# Patient Record
Sex: FEMALE
[De-identification: names, ages, dates, MRNs, and addresses within clinical notes are randomized per-mention and may not be internally consistent; named-entity substitution may affect disease eponyms.]

---

## 2021-03-22 PROBLEM — Z00.00 ENCOUNTER FOR PREVENTIVE HEALTH EXAMINATION: Status: ACTIVE | Noted: 2021-03-22

## 2021-03-25 ENCOUNTER — APPOINTMENT (OUTPATIENT)
Dept: CARDIOLOGY | Facility: CLINIC | Age: 41
End: 2021-03-25
Payer: COMMERCIAL

## 2021-03-25 ENCOUNTER — NON-APPOINTMENT (OUTPATIENT)
Age: 41
End: 2021-03-25

## 2021-03-25 VITALS
BODY MASS INDEX: 30.91 KG/M2 | SYSTOLIC BLOOD PRESSURE: 112 MMHG | TEMPERATURE: 98.9 F | DIASTOLIC BLOOD PRESSURE: 82 MMHG | WEIGHT: 168 LBS | HEIGHT: 62 IN | OXYGEN SATURATION: 99 % | HEART RATE: 79 BPM

## 2021-03-25 VITALS — SYSTOLIC BLOOD PRESSURE: 108 MMHG | DIASTOLIC BLOOD PRESSURE: 82 MMHG

## 2021-03-25 DIAGNOSIS — Z82.3 FAMILY HISTORY OF STROKE: ICD-10-CM

## 2021-03-25 DIAGNOSIS — Z78.9 OTHER SPECIFIED HEALTH STATUS: ICD-10-CM

## 2021-03-25 DIAGNOSIS — Z3A.19 19 WEEKS GESTATION OF PREGNANCY: ICD-10-CM

## 2021-03-25 PROCEDURE — 93000 ELECTROCARDIOGRAM COMPLETE: CPT

## 2021-03-25 PROCEDURE — 99072 ADDL SUPL MATRL&STAF TM PHE: CPT

## 2021-03-25 PROCEDURE — 99202 OFFICE O/P NEW SF 15 MIN: CPT | Mod: 25

## 2021-03-25 RX ORDER — LABETALOL HYDROCHLORIDE 200 MG/1
200 TABLET, FILM COATED ORAL
Qty: 60 | Refills: 2 | Status: COMPLETED | COMMUNITY
Start: 2021-03-25 | End: 2021-03-25

## 2021-03-25 NOTE — PHYSICAL EXAM
[General Appearance - Well Developed] : well developed [Normal Appearance] : normal appearance [Well Groomed] : well groomed [General Appearance - Well Nourished] : well nourished [No Deformities] : no deformities [General Appearance - In No Acute Distress] : no acute distress [Normal Conjunctiva] : the conjunctiva exhibited no abnormalities [Eyelids - No Xanthelasma] : the eyelids demonstrated no xanthelasmas [Normal Oral Mucosa] : normal oral mucosa [No Oral Pallor] : no oral pallor [No Oral Cyanosis] : no oral cyanosis [Normal Jugular Venous A Waves Present] : normal jugular venous A waves present [Normal Jugular Venous V Waves Present] : normal jugular venous V waves present [No Jugular Venous Stein A Waves] : no jugular venous stein A waves [Heart Rate And Rhythm] : heart rate and rhythm were normal [Heart Sounds] : normal S1 and S2 [Murmurs] : no murmurs present [Respiration, Rhythm And Depth] : normal respiratory rhythm and effort [Exaggerated Use Of Accessory Muscles For Inspiration] : no accessory muscle use [Auscultation Breath Sounds / Voice Sounds] : lungs were clear to auscultation bilaterally [Abdomen Soft] : soft [Abdomen Tenderness] : non-tender [Abdomen Mass (___ Cm)] : no abdominal mass palpated [Abnormal Walk] : normal gait [Gait - Sufficient For Exercise Testing] : the gait was sufficient for exercise testing [Nail Clubbing] : no clubbing of the fingernails [Cyanosis, Localized] : no localized cyanosis [Petechial Hemorrhages (___cm)] : no petechial hemorrhages [Skin Color & Pigmentation] : normal skin color and pigmentation [] : no rash [No Venous Stasis] : no venous stasis [Skin Lesions] : no skin lesions [No Skin Ulcers] : no skin ulcer [No Xanthoma] : no  xanthoma was observed [Oriented To Time, Place, And Person] : oriented to person, place, and time [Affect] : the affect was normal [Mood] : the mood was normal [No Anxiety] : not feeling anxious

## 2021-03-25 NOTE — HISTORY OF PRESENT ILLNESS
[FreeTextEntry1] : 41 y/o F , currently at 18 weeks gestation (LMP 20), presents for initial visit of gestational HTN. She was started on labetalol 200mg q12 in January. Denies history of HTN or HTN during her previous pregnancies. She checks BP at home and it is usually well below 120/80. Currently feels well with no complaints. Denies HA, blurry vision, chest pain, SOB, palpitations, dizziness, lightheadedness. No LE edema. States she did 24 hour urine collection and results showed borderline proteinuria (asked to bring labs to next appointment). \par \par EKG: NSR, RSR'

## 2021-03-25 NOTE — ASSESSMENT
[FreeTextEntry1] : #Gestational HTN\par BP is well controlled. Patient requesting to d/c medication. \par Will decrease labetalol to 100mg q12 for now. Patient advised to continue checking BP at home, and to increase labetalol to 200mg if it is persistently >120/80. \par Patient advised to bring labs to her next visit\par RTC 1 month

## 2021-05-27 ENCOUNTER — APPOINTMENT (OUTPATIENT)
Dept: CARDIOLOGY | Facility: CLINIC | Age: 41
End: 2021-05-27
Payer: COMMERCIAL

## 2021-05-27 VITALS
SYSTOLIC BLOOD PRESSURE: 120 MMHG | BODY MASS INDEX: 32.2 KG/M2 | DIASTOLIC BLOOD PRESSURE: 60 MMHG | WEIGHT: 175 LBS | HEIGHT: 62 IN | HEART RATE: 84 BPM | TEMPERATURE: 99.6 F | OXYGEN SATURATION: 99 %

## 2021-05-27 PROCEDURE — 99072 ADDL SUPL MATRL&STAF TM PHE: CPT

## 2021-05-27 PROCEDURE — 99213 OFFICE O/P EST LOW 20 MIN: CPT

## 2021-05-27 RX ORDER — LABETALOL HYDROCHLORIDE 100 MG/1
100 TABLET, FILM COATED ORAL
Qty: 60 | Refills: 5 | Status: COMPLETED | COMMUNITY
Start: 2021-03-25 | End: 2021-05-27

## 2021-05-27 NOTE — PHYSICAL EXAM

## 2021-05-27 NOTE — HISTORY OF PRESENT ILLNESS
[FreeTextEntry1] : 39 y/o F , currently at 26 weeks gestation (LMP 20), presents for initial visit of gestational HTN. \par \par Denies history of HTN or HTN during her previous pregnancies. She checks BP at home and it is usually well below 120/80. She was started on labetalol 200mg q12 in January, which was decreased to 100mg q12 at previous visit. Currently, she states that she has been taking labetalol 100mg PRN, when BP is 130/80 (about 2-3 times per week). She states BP is well controlled on home log, which she does not have with her today. Feels well with no complaints. Denies HA, blurry vision, chest pain, SOB, palpitations, dizziness, lightheadedness. No LE edema. States she did 24 hour urine collection and results showed borderline proteinuria (asked to bring labs to next appointment). \par \par

## 2021-07-01 ENCOUNTER — APPOINTMENT (OUTPATIENT)
Dept: CARDIOLOGY | Facility: CLINIC | Age: 41
End: 2021-07-01
Payer: COMMERCIAL

## 2021-07-01 ENCOUNTER — NON-APPOINTMENT (OUTPATIENT)
Age: 41
End: 2021-07-01

## 2021-07-01 VITALS
BODY MASS INDEX: 32.94 KG/M2 | OXYGEN SATURATION: 99 % | SYSTOLIC BLOOD PRESSURE: 116 MMHG | HEIGHT: 62 IN | TEMPERATURE: 97.8 F | WEIGHT: 179 LBS | DIASTOLIC BLOOD PRESSURE: 78 MMHG | HEART RATE: 90 BPM

## 2021-07-01 DIAGNOSIS — Z3A.00 WEEKS OF GESTATION OF PREGNANCY NOT SPECIFIED: ICD-10-CM

## 2021-07-01 PROCEDURE — 93000 ELECTROCARDIOGRAM COMPLETE: CPT

## 2021-07-01 PROCEDURE — 93306 TTE W/DOPPLER COMPLETE: CPT

## 2021-07-01 PROCEDURE — 99213 OFFICE O/P EST LOW 20 MIN: CPT | Mod: 25

## 2021-07-01 PROCEDURE — 99072 ADDL SUPL MATRL&STAF TM PHE: CPT

## 2021-07-01 NOTE — HISTORY OF PRESENT ILLNESS
[FreeTextEntry1] : 41 y/o F , currently at 31 weeks gestation (LMP 20), presents for follow up of gestational HTN. \par Denies history of HTN or HTN during her previous pregnancies. Patient was previously seen 3/27/21. She started taking labetalol 100mg at bedtime, and notes her BP is well controlled during the day. When she checks it in the evening, SBP is often 130s-140s. She otherwise feels well with no complaints. Denies HA, blurry vision, chest pain, SOB, palpitations, dizziness, lightheadedness, syncope. Has trace LE edema. \par

## 2021-07-01 NOTE — ASSESSMENT
[FreeTextEntry1] : #Gestational HTN\par #31 weeks gestation \par BP currently mildly elevated in the evening; well controlled during the day\par Add AM dose of labetalol 100mg\par Continue BP log\par TTE was done; results pending \par RTC 1 month

## 2021-07-01 NOTE — PHYSICAL EXAM

## 2021-09-23 ENCOUNTER — NON-APPOINTMENT (OUTPATIENT)
Age: 41
End: 2021-09-23

## 2021-09-23 ENCOUNTER — APPOINTMENT (OUTPATIENT)
Dept: CARDIOLOGY | Facility: CLINIC | Age: 41
End: 2021-09-23
Payer: COMMERCIAL

## 2021-09-23 VITALS
SYSTOLIC BLOOD PRESSURE: 118 MMHG | HEIGHT: 62 IN | TEMPERATURE: 99.3 F | WEIGHT: 174 LBS | BODY MASS INDEX: 32.02 KG/M2 | OXYGEN SATURATION: 97 % | RESPIRATION RATE: 16 BRPM | HEART RATE: 95 BPM | DIASTOLIC BLOOD PRESSURE: 70 MMHG

## 2021-09-23 PROCEDURE — 93000 ELECTROCARDIOGRAM COMPLETE: CPT

## 2021-09-23 PROCEDURE — 99213 OFFICE O/P EST LOW 20 MIN: CPT

## 2021-09-23 RX ORDER — KRILL/OM-3/DHA/EPA/PHOSPHO/AST 1000-230MG
81 CAPSULE ORAL
Qty: 90 | Refills: 1 | Status: DISCONTINUED | COMMUNITY
Start: 2021-03-25 | End: 2021-09-23

## 2021-09-23 NOTE — HISTORY OF PRESENT ILLNESS
[FreeTextEntry1] : 39 y/o F  with gestational HTN, s/p  on 21 at 37 weeks with no complication. currently at 31 weeks gestation (LMP 20), presents for follow up. Denies history of HTN or HTN during her previous pregnancies. Patient was previously seen 21. Was started on labetalol 300mg q8 immediately postpartum, but since then has decreased back to labetalol 100mg twice daily. She otherwise feels well with no complaints. Denies HA, blurry vision, chest pain, SOB, palpitations, dizziness, lightheadedness, syncope. Has trace LE edema. \par

## 2021-09-23 NOTE — ASSESSMENT
[FreeTextEntry1] : #Gestational HTN\par S/p   at 37 weeks gestation \par Currently patient is on labetalol 100mg q12, states she did not take medication this morning \par /70\par Will decrease labetalol to 100mg at bedtime\par Patient advised to check BP in the morning and take an additional dose if it is >130/90\par RTC 1 month for BP check and further titration

## 2021-09-23 NOTE — CARDIOLOGY SUMMARY
[de-identified] : TTE 7/1/21: LVEF 55-60%, normal TTE [de-identified] : 9/23/21: NSR, normal EKG \par 3/25/21: NSR, RSR'

## 2021-10-28 ENCOUNTER — APPOINTMENT (OUTPATIENT)
Dept: CARDIOLOGY | Facility: CLINIC | Age: 41
End: 2021-10-28
Payer: COMMERCIAL

## 2021-10-28 VITALS
RESPIRATION RATE: 16 BRPM | OXYGEN SATURATION: 97 % | HEART RATE: 77 BPM | BODY MASS INDEX: 31.65 KG/M2 | WEIGHT: 172 LBS | HEIGHT: 62 IN | SYSTOLIC BLOOD PRESSURE: 112 MMHG | TEMPERATURE: 99.1 F | DIASTOLIC BLOOD PRESSURE: 80 MMHG

## 2021-10-28 DIAGNOSIS — O13.9 GESTATIONAL [PREGNANCY-INDUCED] HYPERTENSION W/OUT SIGNIFICANT PROTEINURIA, UNSPECIFIED TRIMESTER: ICD-10-CM

## 2021-10-28 PROCEDURE — 99213 OFFICE O/P EST LOW 20 MIN: CPT

## 2021-10-28 NOTE — HISTORY OF PRESENT ILLNESS
[FreeTextEntry1] : 40 y/o F  with gestational HTN, s/p  on 21 at 37 weeks with no complication, presents for follow up. Denies history of HTN or HTN during her previous pregnancies. Was started on labetalol 300mg q8 immediately postpartum, but since then has decreased back to labetalol 100mg twice daily. She otherwise feels well with no complaints. Denies HA, blurry vision, chest pain, SOB, palpitations, dizziness, lightheadedness, syncope. Has trace LE edema. \par \par 10/28/21:\par Was previously seen by me 21\par Labetalol was decreased to 100mg at bedtime\par Patient has BP log with her; BP is mostly controlled with -130, occasionally > 140\par Feels well with no current complaints \par

## 2021-10-28 NOTE — ASSESSMENT
[FreeTextEntry1] : 40 y/o F  with gestational HTN, s/p  on 21 at 37 weeks with no complication, presents for follow up. \par \par #Gestational HTN\par BP bordeline. In office today, 112/80. Home BP log mostly controlled. \par Dc labetalol\par DASH diet discussed in detail \par Continue home BP monitoring for 1 month off meds; if uncontrolled, will restart. \par \par #Obesity\par BMI 31\par Diet/lifestyle modifications discussed \par \par RTC 1 month

## 2021-10-28 NOTE — CARDIOLOGY SUMMARY
[de-identified] : 9/23/21: NSR, normal EKG \par 3/25/21: NSR, RSR'  [de-identified] : TTE 7/1/21: LVEF 55-60%, normal TTE

## 2021-11-23 ENCOUNTER — APPOINTMENT (OUTPATIENT)
Dept: OBGYN | Facility: CLINIC | Age: 41
End: 2021-11-23
Payer: COMMERCIAL

## 2021-11-23 VITALS
SYSTOLIC BLOOD PRESSURE: 138 MMHG | HEIGHT: 62 IN | BODY MASS INDEX: 32.02 KG/M2 | DIASTOLIC BLOOD PRESSURE: 74 MMHG | WEIGHT: 174 LBS

## 2021-11-23 DIAGNOSIS — Z30.09 ENCOUNTER FOR OTHER GENERAL COUNSELING AND ADVICE ON CONTRACEPTION: ICD-10-CM

## 2021-11-23 DIAGNOSIS — Z72.3 LACK OF PHYSICAL EXERCISE: ICD-10-CM

## 2021-11-23 DIAGNOSIS — O13.9 GESTATIONAL [PREGNANCY-INDUCED] HYPERTENSION W/OUT SIGNIFICANT PROTEINURIA, UNSPECIFIED TRIMESTER: ICD-10-CM

## 2021-11-23 DIAGNOSIS — Z82.49 FAMILY HISTORY OF ISCHEMIC HEART DISEASE AND OTHER DISEASES OF THE CIRCULATORY SYSTEM: ICD-10-CM

## 2021-11-23 PROCEDURE — 99203 OFFICE O/P NEW LOW 30 MIN: CPT

## 2021-11-23 RX ORDER — LABETALOL HYDROCHLORIDE 100 MG/1
100 TABLET, FILM COATED ORAL
Qty: 30 | Refills: 3 | Status: DISCONTINUED | COMMUNITY
Start: 2021-05-27 | End: 2021-11-23

## 2021-12-06 ENCOUNTER — NON-APPOINTMENT (OUTPATIENT)
Age: 41
End: 2021-12-06

## 2021-12-29 ENCOUNTER — APPOINTMENT (OUTPATIENT)
Dept: CARDIOLOGY | Facility: CLINIC | Age: 41
End: 2021-12-29

## 2022-01-13 ENCOUNTER — APPOINTMENT (OUTPATIENT)
Dept: CARDIOLOGY | Facility: CLINIC | Age: 42
End: 2022-01-13
Payer: COMMERCIAL

## 2022-01-13 VITALS
WEIGHT: 171 LBS | TEMPERATURE: 98.7 F | BODY MASS INDEX: 31.47 KG/M2 | RESPIRATION RATE: 16 BRPM | SYSTOLIC BLOOD PRESSURE: 128 MMHG | HEIGHT: 62 IN | OXYGEN SATURATION: 96 % | DIASTOLIC BLOOD PRESSURE: 80 MMHG | HEART RATE: 86 BPM

## 2022-01-13 PROCEDURE — 99213 OFFICE O/P EST LOW 20 MIN: CPT

## 2022-01-13 NOTE — HISTORY OF PRESENT ILLNESS
[FreeTextEntry1] : 40 y/o F  with gestational HTN, s/p  on 21 at 37 weeks with no complication, presents for follow up. Denies history of HTN or HTN during her previous pregnancies. Was started on labetalol 300mg q8 immediately postpartum, but since then has decreased back to labetalol 100mg twice daily. \par \par 22: \par Was previously seen by me 10/28/21\par Labetalol was restarted due to uncontrolled BP \par She otherwise feels well with no complaints. \par Denies HA, blurry vision, chest pain, SOB, palpitations, dizziness, lightheadedness, syncope, LE edema.

## 2022-01-13 NOTE — CARDIOLOGY SUMMARY
[de-identified] : 9/23/21: NSR, normal EKG \par 3/25/21: NSR, RSR'  [de-identified] : TTE 7/1/21: LVEF 55-60%, normal TTE

## 2022-01-13 NOTE — ASSESSMENT
[FreeTextEntry1] : 42 y/o F  with gestational HTN, s/p  on 21 at 37 weeks with no complication, presents for follow up. \par \par #HTN\par Controlled\par Continue labetalol 100mg at bedtime\par DASH diet discussed\par \par #Obesity\par BMI 31\par Diet/lifestyle modifications discussed\par \par Follow up with PCP\par RTC 1 year

## 2022-01-17 ENCOUNTER — APPOINTMENT (OUTPATIENT)
Dept: OBGYN | Facility: CLINIC | Age: 42
End: 2022-01-17

## 2022-01-31 ENCOUNTER — APPOINTMENT (OUTPATIENT)
Dept: OBGYN | Facility: CLINIC | Age: 42
End: 2022-01-31
Payer: COMMERCIAL

## 2022-01-31 VITALS
TEMPERATURE: 97.4 F | HEIGHT: 62 IN | WEIGHT: 173.6 LBS | DIASTOLIC BLOOD PRESSURE: 84 MMHG | SYSTOLIC BLOOD PRESSURE: 124 MMHG | BODY MASS INDEX: 31.94 KG/M2

## 2022-01-31 DIAGNOSIS — Z01.419 ENCOUNTER FOR GYNECOLOGICAL EXAMINATION (GENERAL) (ROUTINE) W/OUT ABNORMAL FINDINGS: ICD-10-CM

## 2022-01-31 DIAGNOSIS — Z12.31 ENCOUNTER FOR SCREENING MAMMOGRAM FOR MALIGNANT NEOPLASM OF BREAST: ICD-10-CM

## 2022-01-31 DIAGNOSIS — Z11.3 ENCOUNTER FOR SCREENING FOR INFECTIONS WITH A PREDOMINANTLY SEXUAL MODE OF TRANSMISSION: ICD-10-CM

## 2022-01-31 PROCEDURE — 99396 PREV VISIT EST AGE 40-64: CPT

## 2022-01-31 RX ORDER — FENUGREEK SEED/BL.THISTLE/ANIS 340 MG
0.267 & 373 CAPSULE ORAL DAILY
Refills: 0 | Status: DISCONTINUED | COMMUNITY
Start: 2021-03-25 | End: 2022-01-31

## 2022-01-31 NOTE — PHYSICAL EXAM
[Chaperone Present] : A chaperone was present in the examining room during all aspects of the physical examination [Appropriately responsive] : appropriately responsive [Alert] : alert [No Acute Distress] : no acute distress [Soft] : soft [Non-tender] : non-tender [Non-distended] : non-distended [No Mass] : no mass [Oriented x3] : oriented x3 [Examination Of The Breasts] : a normal appearance [No Discharge] : no discharge [No Masses] : no breast masses were palpable [Labia Majora] : normal [Labia Minora] : normal [Pink Rugae] : pink rugae [No Bleeding] : There was no active vaginal bleeding [Normal] : normal [Uterine Adnexae] : normal [FreeTextEntry1] : TREY Ma [External Hemorrhoid] : external hemorrhoid

## 2022-01-31 NOTE — HISTORY OF PRESENT ILLNESS
[FreeTextEntry1] : Ms. MONAHAN presents for her annual well woman visit. \par \par She is doing well. She denies having any abdominal pain, vaginal bleeding, or vaginal discharge. \par \par LMP: 2020. She is s/p  at [facility] in 2021. \par \par She is not sexually active. Her partner is considering a vasectomy. She declines contraception. She desires only STI vaginal swab testing. \par \par She desires a breast examination. She is currently breastfeeding and bottle-feeding. No breast complaints.\par \par She has h/o HTN and is taking Labetalol 100 mg qHS. She is currently following with PCP and Cardiology.\par \par She previously received her GYN care with Dr. Koch. [PGxTotal] : 6 [[facility]] : 3 [[facility]] : 3 [PGHxABInduced] : 1 [PGHxABSpont] : 2

## 2022-01-31 NOTE — DISCUSSION/SUMMARY
[FreeTextEntry1] : -Annual well woman visit done today. Pap collected. Patient desires only STI vaginal swab testing- GC/Chl/Trich were ordered.\par \par -Patient is not sexually active. She declines contraception.\par \par -Rx for screening mammogram was ordered. Benign breast exam.\par \par -She was advised to take vitamin D, calcium, and continue weightbearing exercises.\par \par -Follow up in 1 year for her annual GYN exam or PRN.\par \par All questions and concerns were discussed.

## 2022-01-31 NOTE — HISTORY OF PRESENT ILLNESS
[FreeTextEntry1] : Ms. MONAHAN presents for her annual well woman visit. \par \par She is doing well. She denies having any abdominal pain, vaginal bleeding, or vaginal discharge. \par \par LMP: 2020. She is s/p  at [facility] in 2021. \par \par She is not sexually active. Her partner is considering a vasectomy. She declines contraception. She desires only STI vaginal swab testing. \par \par She desires a breast examination. She is currently breastfeeding and bottle-feeding. No breast complaints.\par \par She has h/o HTN and is taking Labetalol 100 mg qHS. She is currently following with PCP and Cardiology.\par \par She previously received her GYN care with Dr. Koch. [PGxTotal] : 6 [[facility]] : 3 [[facility]] : 3 [PGHxABInduced] : 1 [PGHxABSpont] : 2

## 2022-02-09 LAB
C TRACH RRNA SPEC QL NAA+PROBE: NOT DETECTED
CYTOLOGY CVX/VAG DOC THIN PREP: NORMAL
HPV HIGH+LOW RISK DNA PNL CVX: NOT DETECTED
N GONORRHOEA RRNA SPEC QL NAA+PROBE: NOT DETECTED
SOURCE AMPLIFICATION: NORMAL
SOURCE TP AMPLIFICATION: NORMAL
T VAGINALIS RRNA SPEC QL NAA+PROBE: NOT DETECTED

## 2022-02-15 PROBLEM — Z30.09 CONTRACEPTIVE EDUCATION: Status: ACTIVE | Noted: 2022-02-15

## 2022-02-15 NOTE — DISCUSSION/SUMMARY
[FreeTextEntry1] : -Patient presents to establish GYN care and is transferring from Dr. Koch's office. Recommended that she sign ROR to obtain records.\par \par -She is doing well today without any complaints. \par \par -Contraceptive counseling- She is not sexually active and is not on contraception. She declines contraception. She is considering possible surgical sterilization, however desires to discuss further with her .\par \par -h/o gestational HTN (no meds)- BP today: 138/76. She is following with Cardiology.\par \par -Follow up in 1 month for annual GYN exam or PRN. \par \par All questions and concerns were discussed.

## 2022-02-15 NOTE — PHYSICAL EXAM
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

## 2022-02-15 NOTE — HISTORY OF PRESENT ILLNESS
[Patient reported PAP Smear was normal] : Patient reported PAP Smear was normal [N] : Patient does not use contraception [Monogamous (Male Partner)] : is monogamous with a male partner [Y] : Positive pregnancy history [Menarche Age: ____] : age at menarche was [unfilled] [Currently Active] : currently active [Men] : men [Vaginal] : vaginal [No] : No [TextBox_4] : New pt presents for an annual exam [PapSmeardate] : 2020 [LMPDate] : 11/16/2020 [PGxTotal] : 6 [[facility]] : 3 [[facility]iving] : 3 [PGHxABInduced] : 1 [PGHxABSpont] : 2 [FreeTextEntry1] : 11/16/2020

## 2022-07-28 ENCOUNTER — APPOINTMENT (OUTPATIENT)
Dept: CARDIOLOGY | Facility: CLINIC | Age: 42
End: 2022-07-28

## 2022-07-28 ENCOUNTER — NON-APPOINTMENT (OUTPATIENT)
Age: 42
End: 2022-07-28

## 2022-07-28 VITALS — DIASTOLIC BLOOD PRESSURE: 100 MMHG | SYSTOLIC BLOOD PRESSURE: 126 MMHG

## 2022-07-28 VITALS — SYSTOLIC BLOOD PRESSURE: 142 MMHG | DIASTOLIC BLOOD PRESSURE: 94 MMHG

## 2022-07-28 VITALS
SYSTOLIC BLOOD PRESSURE: 122 MMHG | HEART RATE: 68 BPM | HEIGHT: 62 IN | TEMPERATURE: 99 F | DIASTOLIC BLOOD PRESSURE: 102 MMHG | WEIGHT: 158 LBS | BODY MASS INDEX: 29.08 KG/M2 | OXYGEN SATURATION: 98 %

## 2022-07-28 DIAGNOSIS — R12 HEARTBURN: ICD-10-CM

## 2022-07-28 DIAGNOSIS — E66.9 OBESITY, UNSPECIFIED: ICD-10-CM

## 2022-07-28 PROCEDURE — 99213 OFFICE O/P EST LOW 20 MIN: CPT | Mod: 25

## 2022-07-28 PROCEDURE — 93000 ELECTROCARDIOGRAM COMPLETE: CPT

## 2022-07-28 RX ORDER — PRENATAL VIT 49/IRON FUM/FOLIC 6.75-0.2MG
TABLET ORAL
Refills: 0 | Status: DISCONTINUED | COMMUNITY
End: 2022-07-28

## 2022-07-28 NOTE — HISTORY OF PRESENT ILLNESS
[FreeTextEntry1] : 42 y/o F  with gestational HTN, s/p  on 21 at 37 weeks with no complication, presents for follow up. Denies history of HTN or HTN during her previous pregnancies. \par \par 22\par Previously seen 22\par Was restarted on labetalol, which patient is taking PRN BP > 140/90\par She feels well with no complaints. \par Denies HA, blurry vision, chest pain, SOB, palpitations, dizziness, lightheadedness, syncope, LE edema. \par Walks 20 mins/day

## 2022-07-28 NOTE — ASSESSMENT
[FreeTextEntry1] : 40 y/o F  with gestational HTN, s/p  on 21 at 37 weeks with no complication, presents for follow up. \par \par #HTN\par Uncontrolled\par Patient is unwilling to take daily antiHTN meds as she says it is normally <120/80\par Ordered 24 hour ambulatory BP cuff for further evaluation\par DASH diet discussed\par \par #Obesity\par BMI now 28\par Weight improved 173 -> 158 lbs\par Continued diet/lifestyle modifications discussed\par \par RTC 3 months

## 2022-09-16 RX ORDER — LABETALOL HYDROCHLORIDE 100 MG/1
100 TABLET, FILM COATED ORAL DAILY
Qty: 30 | Refills: 3 | Status: COMPLETED | COMMUNITY
End: 2022-09-16

## 2022-11-03 ENCOUNTER — APPOINTMENT (OUTPATIENT)
Dept: CARDIOLOGY | Facility: CLINIC | Age: 42
End: 2022-11-03

## 2022-11-03 ENCOUNTER — NON-APPOINTMENT (OUTPATIENT)
Age: 42
End: 2022-11-03

## 2022-11-03 VITALS
SYSTOLIC BLOOD PRESSURE: 136 MMHG | OXYGEN SATURATION: 100 % | TEMPERATURE: 98.6 F | WEIGHT: 152 LBS | DIASTOLIC BLOOD PRESSURE: 84 MMHG | BODY MASS INDEX: 27.8 KG/M2 | RESPIRATION RATE: 16 BRPM | HEART RATE: 70 BPM

## 2022-11-03 DIAGNOSIS — I10 ESSENTIAL (PRIMARY) HYPERTENSION: ICD-10-CM

## 2022-11-03 DIAGNOSIS — Z13.6 ENCOUNTER FOR SCREENING FOR CARDIOVASCULAR DISORDERS: ICD-10-CM

## 2022-11-03 PROCEDURE — 99213 OFFICE O/P EST LOW 20 MIN: CPT | Mod: 25

## 2022-11-03 PROCEDURE — 93000 ELECTROCARDIOGRAM COMPLETE: CPT

## 2022-11-03 NOTE — DISCUSSION/SUMMARY
[FreeTextEntry1] : 40 y/o F with HTN presents for follow up\par \par #HTN\par /84 today\par Continue norvasc 5mg\par DASH diet discussed\par \par #CV risk assessment\par Patient to have bloodwork with PCP; will bring to next visit  [EKG obtained to assist in diagnosis and management of assessed problem(s)] : EKG obtained to assist in diagnosis and management of assessed problem(s)

## 2022-11-03 NOTE — CARDIOLOGY SUMMARY
Medication:   Requested Prescriptions     Pending Prescriptions Disp Refills    vitamin D (ERGOCALCIFEROL) 1.25 MG (83795 UT) CAPS capsule [Pharmacy Med Name: VITAMIN D2 50,000IU (ERGO) CAP RX] 4 capsule 2     Sig: TAKE 1 CAPSULE BY MOUTH WEEKLY       Last Filled:  6/17/22    Patient Phone Number: [phone] (home)     Last appt: 10/21/2022   Next appt: Visit date not found
[de-identified] : 7/28/22: SR, RSR', nonspecific T wave changes

## 2022-11-03 NOTE — CARDIOLOGY SUMMARY
[de-identified] : SR, CASANDRAR' [de-identified] : TTE 7/1/21: LVEF 55-60%, no valve disease [de-identified] : A

## 2022-11-03 NOTE — HISTORY OF PRESENT ILLNESS
[FreeTextEntry1] : 40 y/o F with HTN, initially seen by me for gestational HTN s/p  on 21 at 37 weeks with no complications. \par \par 11/3/22:\par Patient was last seen by me 22\par Wore ambulatory BP monitor with average /90 with elevated readings throughout the day\par Was started on norvasc 5mg daily \par She feels well with no complaints. \par Denies HA, blurry vision, chest pain, SOB, palpitations, dizziness, lightheadedness, syncope, LE edema. \par Walks 20 mins/day

## 2023-10-13 RX ORDER — AMLODIPINE BESYLATE 5 MG/1
5 TABLET ORAL
Qty: 30 | Refills: 0 | Status: ACTIVE | COMMUNITY
Start: 2022-09-16 | End: 1900-01-01

## 2023-12-07 ENCOUNTER — APPOINTMENT (OUTPATIENT)
Dept: NEPHROLOGY | Facility: CLINIC | Age: 43
End: 2023-12-07

## 2025-07-28 NOTE — END OF VISIT
Previously Declined (within the last year)
[FreeTextEntry3] : I, Aaliyah Min solely acted as a scribe for Dr. Rachna Hamilton on 11/23/2021 . All medical entries made by the scribe were at my, Dr. Hamilton's, direction and personally dictated by me on 11/23/2021 . I have reviewed the chart and agree that the record accurately reflects my personal performance of the history, physical exam, assessment and plan. I have also personally directed, reviewed, and agreed with the chart.